# Patient Record
Sex: MALE | Employment: OTHER | URBAN - METROPOLITAN AREA
[De-identification: names, ages, dates, MRNs, and addresses within clinical notes are randomized per-mention and may not be internally consistent; named-entity substitution may affect disease eponyms.]

---

## 2019-05-02 NOTE — PATIENT DISCUSSION
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 Can slowly start to lose central vision. Edu pt there is no treatment or cure for AMD. Recommend continuing with AREDS 2, recommend daily [employer] Grid use (copy of card given) and advised to call with any vision changes. Due to PED in OS would recommend FA/FP today. R & B discussed in detail. Pt elects to proceed. Unable to obtain FA/FP today due to poor veins. Advised pt will just need to be followed closely. Advised pt to drink lots of water prior to next visit in case PED in OS worsens, may want to repeat FA/FP. Pt verbalized understanding.

## 2019-05-02 NOTE — PATIENT DISCUSSION
Explained that serous PED can cause central visual distortions. Patient understands that significantly increased distortion, however, requires further evaluation.

## 2020-08-11 ENCOUNTER — PREPPED CHART (OUTPATIENT)
Dept: URBAN - METROPOLITAN AREA CLINIC 25 | Facility: CLINIC | Age: 75
End: 2020-08-11

## 2021-10-05 NOTE — PATIENT DISCUSSION
A[employer] grid at home. MVI/AREDS discussed. Patient to call if any changes in vision or grid card. Discussed on rounds with Housestaff

## 2021-12-10 ASSESSMENT — TONOMETRY
OD_IOP_MMHG: 7
OS_IOP_MMHG: 6
